# Patient Record
Sex: FEMALE | URBAN - METROPOLITAN AREA
[De-identification: names, ages, dates, MRNs, and addresses within clinical notes are randomized per-mention and may not be internally consistent; named-entity substitution may affect disease eponyms.]

---

## 2022-11-17 ENCOUNTER — OFFICE VISIT (OUTPATIENT)
Dept: URBAN - METROPOLITAN AREA CLINIC 16 | Facility: CLINIC | Age: 32
End: 2022-11-17
Payer: COMMERCIAL

## 2022-11-17 DIAGNOSIS — H52.223 REGULAR ASTIGMATISM, BILATERAL: ICD-10-CM

## 2022-11-17 DIAGNOSIS — H04.123 TEAR FILM INSUFFICIENCY OF BILATERAL LACRIMAL GLANDS: Primary | ICD-10-CM

## 2022-11-17 DIAGNOSIS — H52.13 MYOPIA, BILATERAL: ICD-10-CM

## 2022-11-17 PROCEDURE — 92004 COMPRE OPH EXAM NEW PT 1/>: CPT | Performed by: OPTOMETRIST

## 2022-11-17 ASSESSMENT — VISUAL ACUITY
OD: 20/20
OS: 20/20

## 2022-11-17 ASSESSMENT — INTRAOCULAR PRESSURE
OD: 12
OS: 11

## 2022-11-17 ASSESSMENT — KERATOMETRY
OD: 41.25
OS: 41.00

## 2022-11-17 NOTE — IMPRESSION/PLAN
Impression: Tear film insufficiency of bilateral lacrimal glands: H04.123. Plan: Pt ed re: condition. Use AT's BID-QID OU, Omega-3 fatty acids, humidifier. Consider Restasis, Alrex if condition not improved at next exam.  RTC 3-4 weeks if conditions persist; otherwise, RTC 1 year x CEE.